# Patient Record
Sex: FEMALE | Race: WHITE | URBAN - METROPOLITAN AREA
[De-identification: names, ages, dates, MRNs, and addresses within clinical notes are randomized per-mention and may not be internally consistent; named-entity substitution may affect disease eponyms.]

---

## 2017-05-05 ENCOUNTER — EMPLOYEE WELLNESS (OUTPATIENT)
Dept: OTHER | Age: 72
End: 2017-05-05

## 2017-05-05 LAB
CHOLESTEROL, TOTAL: 174 MG/DL (ref 0–199)
GLUCOSE BLD-MCNC: 100 MG/DL (ref 70–99)
HDLC SERPL-MCNC: 61 MG/DL (ref 40–60)
LDL CHOLESTEROL CALCULATED: 84 MG/DL
TRIGL SERPL-MCNC: 147 MG/DL (ref 0–150)

## 2018-03-20 VITALS — WEIGHT: 199 LBS

## 2018-04-09 ENCOUNTER — EMPLOYEE WELLNESS (OUTPATIENT)
Dept: OTHER | Age: 73
End: 2018-04-09

## 2018-04-09 LAB
CHOLESTEROL, TOTAL: 191 MG/DL (ref 0–199)
GLUCOSE BLD-MCNC: 125 MG/DL (ref 70–99)
HDLC SERPL-MCNC: 71 MG/DL (ref 40–60)
LDL CHOLESTEROL CALCULATED: 84 MG/DL
TRIGL SERPL-MCNC: 179 MG/DL (ref 0–150)

## 2018-04-16 VITALS — WEIGHT: 197 LBS

## 2019-04-11 ENCOUNTER — EMPLOYEE WELLNESS (OUTPATIENT)
Dept: OTHER | Age: 74
End: 2019-04-11

## 2019-04-11 LAB
CHOLESTEROL, TOTAL: 168 MG/DL (ref 0–199)
GLUCOSE BLD-MCNC: 109 MG/DL (ref 70–99)
HDLC SERPL-MCNC: 56 MG/DL (ref 40–60)
LDL CHOLESTEROL CALCULATED: 82 MG/DL
TRIGL SERPL-MCNC: 151 MG/DL (ref 0–150)

## 2019-04-15 VITALS — WEIGHT: 195 LBS

## 2020-04-21 ENCOUNTER — OFFICE VISIT (OUTPATIENT)
Dept: PRIMARY CARE CLINIC | Age: 75
End: 2020-04-21

## 2020-04-22 LAB
SARS-COV-2: NOT DETECTED
SOURCE: NORMAL

## 2024-09-20 RX ORDER — CETIRIZINE HYDROCHLORIDE 10 MG/1
10 TABLET ORAL DAILY PRN
COMMUNITY
End: 2024-09-23

## 2024-09-20 RX ORDER — MUPIROCIN 20 MG/G
0.5 OINTMENT TOPICAL 3 TIMES DAILY
COMMUNITY
Start: 2024-08-30

## 2024-09-20 RX ORDER — MULTIVITAMIN
1 TABLET ORAL EVERY MORNING
COMMUNITY

## 2024-09-20 RX ORDER — LISINOPRIL AND HYDROCHLOROTHIAZIDE 12.5; 2 MG/1; MG/1
2 TABLET ORAL DAILY
COMMUNITY
Start: 2024-01-19

## 2024-09-20 RX ORDER — ASPIRIN 325 MG
325 TABLET ORAL DAILY
COMMUNITY
Start: 2013-06-11

## 2024-09-20 RX ORDER — METOPROLOL TARTRATE 100 MG
100 TABLET ORAL
COMMUNITY
Start: 2024-01-19

## 2024-09-20 RX ORDER — FLUVOXAMINE MALEATE 50 MG
50 TABLET ORAL
COMMUNITY
Start: 2024-01-19

## 2024-09-20 RX ORDER — FLUTICASONE PROPIONATE 50 MCG
1 SPRAY, SUSPENSION (ML) NASAL DAILY
COMMUNITY
Start: 2019-12-18

## 2024-09-20 RX ORDER — VIT C/ZN GLUC/HERBAL NO.325 90 MG-15MG
2 LOZENGE MUCOUS MEMBRANE EVERY MORNING
COMMUNITY
End: 2024-09-23

## 2024-09-20 RX ORDER — MELOXICAM 15 MG/1
15 TABLET ORAL DAILY
COMMUNITY
Start: 2024-06-04

## 2024-09-20 RX ORDER — ROSUVASTATIN CALCIUM 5 MG/1
5 TABLET, COATED ORAL
COMMUNITY
Start: 2024-06-03

## 2024-09-20 NOTE — PROGRESS NOTES
9/20/2024 1318 PM:    PERSON ANSWERING PHONE ASKED FOR RETURN CALL 9/20/2024 AT 3PM/TS    H&P IN CE 9/13/2024 AND LABS IN [facility] 9/13/2024 AND EKG TRACING IN MEDIA 917/2024/TS    9/20/2024 1557 pm:    PERSON ANSWERING PHONE STATED \"(GERMAN) NOT HOME TRY BACK LATER\"-DID AGREE TO GIVE MESSAGE FOR  PT TO CALL [facility]/TS    NOTIFIED DR PITTS OFFICE TO FAX PREOP ORDERS IF NEEDED-PER EDWIN AT OFFICE \"DR PITTS WILL ENTER ORDERS HIMSELF\"/TS

## 2024-09-27 ENCOUNTER — HOSPITAL ENCOUNTER (OUTPATIENT)
Age: 79
Setting detail: OUTPATIENT SURGERY
Discharge: HOME | End: 2024-09-27
Attending: PODIATRIST | Admitting: PODIATRIST
Payer: MEDICARE

## 2024-09-27 ENCOUNTER — APPOINTMENT (OUTPATIENT)
Dept: GENERAL RADIOLOGY | Age: 79
End: 2024-09-27
Attending: PODIATRIST
Payer: MEDICARE

## 2024-09-27 ENCOUNTER — ANESTHESIA (OUTPATIENT)
Dept: OPERATING ROOM | Age: 79
End: 2024-09-27
Payer: MEDICARE

## 2024-09-27 ENCOUNTER — ANESTHESIA EVENT (OUTPATIENT)
Dept: OPERATING ROOM | Age: 79
End: 2024-09-27
Payer: MEDICARE

## 2024-09-27 VITALS
OXYGEN SATURATION: 96 % | RESPIRATION RATE: 16 BRPM | TEMPERATURE: 97.1 F | HEIGHT: 67 IN | SYSTOLIC BLOOD PRESSURE: 153 MMHG | WEIGHT: 175 LBS | HEART RATE: 49 BPM | BODY MASS INDEX: 27.47 KG/M2 | DIASTOLIC BLOOD PRESSURE: 65 MMHG

## 2024-09-27 DIAGNOSIS — E13.42 DIABETIC POLYNEUROPATHY ASSOCIATED WITH OTHER SPECIFIED DIABETES MELLITUS (HCC): ICD-10-CM

## 2024-09-27 DIAGNOSIS — G89.18 POST-OP PAIN: Primary | ICD-10-CM

## 2024-09-27 DIAGNOSIS — M20.42 HAMMERTOE OF LEFT FOOT: ICD-10-CM

## 2024-09-27 LAB
GLUCOSE BLD-MCNC: 107 MG/DL (ref 70–99)
GLUCOSE BLD-MCNC: 110 MG/DL (ref 70–99)
PERFORMED ON: ABNORMAL
PERFORMED ON: ABNORMAL

## 2024-09-27 PROCEDURE — 88311 DECALCIFY TISSUE: CPT

## 2024-09-27 PROCEDURE — 7100000011 HC PHASE II RECOVERY - ADDTL 15 MIN: Performed by: PODIATRIST

## 2024-09-27 PROCEDURE — 73630 X-RAY EXAM OF FOOT: CPT

## 2024-09-27 PROCEDURE — 2709999900 HC NON-CHARGEABLE SUPPLY: Performed by: PODIATRIST

## 2024-09-27 PROCEDURE — 3700000001 HC ADD 15 MINUTES (ANESTHESIA): Performed by: PODIATRIST

## 2024-09-27 PROCEDURE — 7100000010 HC PHASE II RECOVERY - FIRST 15 MIN: Performed by: PODIATRIST

## 2024-09-27 PROCEDURE — 6360000002 HC RX W HCPCS

## 2024-09-27 PROCEDURE — A4217 STERILE WATER/SALINE, 500 ML: HCPCS | Performed by: PODIATRIST

## 2024-09-27 PROCEDURE — 3700000000 HC ANESTHESIA ATTENDED CARE: Performed by: PODIATRIST

## 2024-09-27 PROCEDURE — 88304 TISSUE EXAM BY PATHOLOGIST: CPT

## 2024-09-27 PROCEDURE — 2580000003 HC RX 258: Performed by: PODIATRIST

## 2024-09-27 PROCEDURE — 7100000000 HC PACU RECOVERY - FIRST 15 MIN: Performed by: PODIATRIST

## 2024-09-27 PROCEDURE — 7100000001 HC PACU RECOVERY - ADDTL 15 MIN: Performed by: PODIATRIST

## 2024-09-27 PROCEDURE — 6360000002 HC RX W HCPCS: Performed by: PODIATRIST

## 2024-09-27 PROCEDURE — 3600000003 HC SURGERY LEVEL 3 BASE: Performed by: PODIATRIST

## 2024-09-27 PROCEDURE — 2580000003 HC RX 258: Performed by: ANESTHESIOLOGY

## 2024-09-27 PROCEDURE — 2500000003 HC RX 250 WO HCPCS: Performed by: PODIATRIST

## 2024-09-27 PROCEDURE — 3600000013 HC SURGERY LEVEL 3 ADDTL 15MIN: Performed by: PODIATRIST

## 2024-09-27 RX ORDER — SODIUM CHLORIDE 9 MG/ML
INJECTION, SOLUTION INTRAVENOUS PRN
Status: DISCONTINUED | OUTPATIENT
Start: 2024-09-27 | End: 2024-09-27 | Stop reason: HOSPADM

## 2024-09-27 RX ORDER — HYDROMORPHONE HYDROCHLORIDE 1 MG/ML
0.5 INJECTION, SOLUTION INTRAMUSCULAR; INTRAVENOUS; SUBCUTANEOUS EVERY 5 MIN PRN
Status: DISCONTINUED | OUTPATIENT
Start: 2024-09-27 | End: 2024-09-27 | Stop reason: HOSPADM

## 2024-09-27 RX ORDER — BUPIVACAINE HYDROCHLORIDE 5 MG/ML
INJECTION, SOLUTION EPIDURAL; INTRACAUDAL PRN
Status: DISCONTINUED | OUTPATIENT
Start: 2024-09-27 | End: 2024-09-27 | Stop reason: ALTCHOICE

## 2024-09-27 RX ORDER — LIDOCAINE HYDROCHLORIDE 10 MG/ML
INJECTION, SOLUTION EPIDURAL; INFILTRATION; INTRACAUDAL; PERINEURAL PRN
Status: DISCONTINUED | OUTPATIENT
Start: 2024-09-27 | End: 2024-09-27 | Stop reason: HOSPADM

## 2024-09-27 RX ORDER — PROCHLORPERAZINE EDISYLATE 5 MG/ML
5 INJECTION INTRAMUSCULAR; INTRAVENOUS
Status: DISCONTINUED | OUTPATIENT
Start: 2024-09-27 | End: 2024-09-27 | Stop reason: HOSPADM

## 2024-09-27 RX ORDER — CIPROFLOXACIN 500 MG/1
500 TABLET, FILM COATED ORAL 2 TIMES DAILY
COMMUNITY

## 2024-09-27 RX ORDER — FENTANYL CITRATE 50 UG/ML
INJECTION, SOLUTION INTRAMUSCULAR; INTRAVENOUS
Status: DISCONTINUED | OUTPATIENT
Start: 2024-09-27 | End: 2024-09-27 | Stop reason: SDUPTHER

## 2024-09-27 RX ORDER — MIDAZOLAM HYDROCHLORIDE 1 MG/ML
INJECTION INTRAMUSCULAR; INTRAVENOUS
Status: DISCONTINUED | OUTPATIENT
Start: 2024-09-27 | End: 2024-09-27 | Stop reason: SDUPTHER

## 2024-09-27 RX ORDER — TRAMADOL HYDROCHLORIDE 50 MG/1
50 TABLET ORAL EVERY 4 HOURS PRN
Qty: 18 TABLET | Refills: 0 | Status: SHIPPED | OUTPATIENT
Start: 2024-09-27 | End: 2024-10-02

## 2024-09-27 RX ORDER — ONDANSETRON 2 MG/ML
4 INJECTION INTRAMUSCULAR; INTRAVENOUS
Status: DISCONTINUED | OUTPATIENT
Start: 2024-09-27 | End: 2024-09-27 | Stop reason: HOSPADM

## 2024-09-27 RX ORDER — IPRATROPIUM BROMIDE AND ALBUTEROL SULFATE 2.5; .5 MG/3ML; MG/3ML
1 SOLUTION RESPIRATORY (INHALATION)
Status: DISCONTINUED | OUTPATIENT
Start: 2024-09-27 | End: 2024-09-27 | Stop reason: HOSPADM

## 2024-09-27 RX ORDER — LANOLIN ALCOHOL/MO/W.PET/CERES
3 CREAM (GRAM) TOPICAL DAILY
COMMUNITY

## 2024-09-27 RX ORDER — SODIUM CHLORIDE 0.9 % (FLUSH) 0.9 %
5-40 SYRINGE (ML) INJECTION EVERY 12 HOURS SCHEDULED
Status: DISCONTINUED | OUTPATIENT
Start: 2024-09-27 | End: 2024-09-27 | Stop reason: HOSPADM

## 2024-09-27 RX ORDER — ONDANSETRON 2 MG/ML
INJECTION INTRAMUSCULAR; INTRAVENOUS
Status: DISCONTINUED | OUTPATIENT
Start: 2024-09-27 | End: 2024-09-27 | Stop reason: SDUPTHER

## 2024-09-27 RX ORDER — SODIUM CHLORIDE, SODIUM LACTATE, POTASSIUM CHLORIDE, CALCIUM CHLORIDE 600; 310; 30; 20 MG/100ML; MG/100ML; MG/100ML; MG/100ML
INJECTION, SOLUTION INTRAVENOUS CONTINUOUS
Status: DISCONTINUED | OUTPATIENT
Start: 2024-09-27 | End: 2024-09-27 | Stop reason: HOSPADM

## 2024-09-27 RX ORDER — NALOXONE HYDROCHLORIDE 0.4 MG/ML
INJECTION, SOLUTION INTRAMUSCULAR; INTRAVENOUS; SUBCUTANEOUS PRN
Status: DISCONTINUED | OUTPATIENT
Start: 2024-09-27 | End: 2024-09-27 | Stop reason: HOSPADM

## 2024-09-27 RX ORDER — LABETALOL HYDROCHLORIDE 5 MG/ML
10 INJECTION, SOLUTION INTRAVENOUS
Status: DISCONTINUED | OUTPATIENT
Start: 2024-09-27 | End: 2024-09-27 | Stop reason: HOSPADM

## 2024-09-27 RX ORDER — LIDOCAINE HYDROCHLORIDE 20 MG/ML
INJECTION, SOLUTION INTRAVENOUS
Status: DISCONTINUED | OUTPATIENT
Start: 2024-09-27 | End: 2024-09-27 | Stop reason: SDUPTHER

## 2024-09-27 RX ORDER — ACETAMINOPHEN 325 MG/1
650 TABLET ORAL
Status: DISCONTINUED | OUTPATIENT
Start: 2024-09-27 | End: 2024-09-27 | Stop reason: HOSPADM

## 2024-09-27 RX ORDER — SODIUM CHLORIDE 0.9 % (FLUSH) 0.9 %
5-40 SYRINGE (ML) INJECTION PRN
Status: DISCONTINUED | OUTPATIENT
Start: 2024-09-27 | End: 2024-09-27 | Stop reason: HOSPADM

## 2024-09-27 RX ORDER — FENTANYL CITRATE 50 UG/ML
25 INJECTION, SOLUTION INTRAMUSCULAR; INTRAVENOUS EVERY 5 MIN PRN
Status: DISCONTINUED | OUTPATIENT
Start: 2024-09-27 | End: 2024-09-27 | Stop reason: HOSPADM

## 2024-09-27 RX ORDER — PROPOFOL 10 MG/ML
INJECTION, EMULSION INTRAVENOUS
Status: DISCONTINUED | OUTPATIENT
Start: 2024-09-27 | End: 2024-09-27 | Stop reason: SDUPTHER

## 2024-09-27 RX ADMIN — SODIUM CHLORIDE, POTASSIUM CHLORIDE, SODIUM LACTATE AND CALCIUM CHLORIDE: 600; 310; 30; 20 INJECTION, SOLUTION INTRAVENOUS at 13:01

## 2024-09-27 RX ADMIN — LIDOCAINE HYDROCHLORIDE 100 MG: 20 INJECTION, SOLUTION INTRAVENOUS at 14:26

## 2024-09-27 RX ADMIN — FENTANYL CITRATE 50 MCG: 50 INJECTION, SOLUTION INTRAMUSCULAR; INTRAVENOUS at 14:53

## 2024-09-27 RX ADMIN — ONDANSETRON 4 MG: 2 INJECTION INTRAMUSCULAR; INTRAVENOUS at 14:32

## 2024-09-27 RX ADMIN — MIDAZOLAM HYDROCHLORIDE 2 MG: 2 INJECTION, SOLUTION INTRAMUSCULAR; INTRAVENOUS at 14:26

## 2024-09-27 RX ADMIN — WATER 2000 MG: 1 INJECTION INTRAMUSCULAR; INTRAVENOUS; SUBCUTANEOUS at 14:32

## 2024-09-27 RX ADMIN — PROPOFOL 150 MCG/KG/MIN: 10 INJECTION, EMULSION INTRAVENOUS at 14:26

## 2024-09-27 RX ADMIN — LIDOCAINE HYDROCHLORIDE 100 MG: 20 INJECTION, SOLUTION INTRAVENOUS at 14:32

## 2024-09-27 RX ADMIN — FENTANYL CITRATE 50 MCG: 50 INJECTION, SOLUTION INTRAMUSCULAR; INTRAVENOUS at 14:39

## 2024-09-27 ASSESSMENT — PAIN SCALES - GENERAL
PAINLEVEL_OUTOF10: 2
PAINLEVEL_OUTOF10: 0
PAINLEVEL_OUTOF10: 0
PAINLEVEL_OUTOF10: 1

## 2024-09-27 ASSESSMENT — ENCOUNTER SYMPTOMS: SHORTNESS OF BREATH: 0

## 2024-09-27 ASSESSMENT — LIFESTYLE VARIABLES: SMOKING_STATUS: 0

## 2024-09-27 NOTE — BRIEF OP NOTE
Brief Postoperative Note      Patient: Sepideh Soliz  YOB: 1945  MRN: [id number]    Date of Procedure: 9/27/2024    Pre-Op Diagnosis Codes:      * Hammertoe of left foot [M20.42]     * Diabetic polyneuropathy associated with other specified diabetes mellitus (HCC) [E13.42]    Post-Op Diagnosis: Same       Procedure(s):  PARTIAL AMPUTATION LEFT SECOND TOE AT INTERPHALANGEAL JOINT    Surgeon(s):  Terry Walker DPM    Assistant:  Resident: Cheryle Leach DPM    Anesthesia: Monitor Anesthesia Care    Estimated Blood Loss (mL): Minimal    Hemostasis: anatomic dissection and electrocautery    Injectables: Pre-Op 10 cc of 1% Lidocaine plain and Post-Op 10 cc of 0.5 % Marcaine plain    Materials: 4-0 Nylon    Complications: None    Specimens:   ID Type Source Tests Collected by Time Destination   A : LEFT DISTAL SECOND DIGIT Tissue Tissue SURGICAL PATHOLOGY Terry Walker DPM 9/27/2024 1441      Implants: None      Drains: * No LDAs found *    Findings:  Infection Present At Time Of Surgery (PATOS) (choose all levels that have infection present):  No infection present  Other Findings: Rigid hammer toe contracture of the left foot second toe. Distal callus of the second toe. Post operative the distal toe was removed and closed in entirety. No clinical signs of infection.    Electronically signed by Cheryle Leach DPM on 9/27/2024 at 3:05 PM

## 2024-09-27 NOTE — H&P
Podiatric Surgery Same Day Pre-op H&P Update Note  Sepideh Soliz I have examined the patient and reviewed the original history and physical completed by Dr. Catarina Montanez CNP on 9/17/24 and find no relevant changes.        The nature of the procedure, possible complications, alternative forms of therapy, post-op course, and post-op goals have been explained to patient/patient family.  All questions have been answered. The consent has been signed.  Patient's surgical site has been marked.       Cheryle Leach DPM   Podiatric Resident PGY2  [employer]  9/27/2024, 1:13 PM

## 2024-09-27 NOTE — PROGRESS NOTES
Patient arrived from OR to PACU # 9 s/p  PARTIAL AMPUTATION LEFT SECOND TOE AT INTERPHALANGEAL JOINT (Left: Foot) per . Attached to PACU monitoring device report received from CRNA who stated no problems intraoperatively. Arrived sedated with simple mask,

## 2024-09-27 NOTE — PROGRESS NOTES
Ambulatory Surgery/Procedure Discharge Note    Vitals:    09/27/24 1600   BP: (!) 153/65   Pulse: (!) 49   Resp: 16   Temp: 97.1 °F (36.2 °C)   SpO2: 96%       In: 500 [I.V.:500]  Out: -     Restroom use offered before discharge.  Yes    Pain assessment:  present - adequately treated. Patient satisfied with current pain level, ice therapy applied to LLE.  Pain Level: 2    BP & HR within 20% of pre-op albertina score.     Discharge order obtained, and discharge instructions reviewed. Patient educated, using the teach back method, about follow up instructions and discharge instructions. A completed copy of the AVS instructions given to patient and all questions answered. IV catheter removed without complaints, catheter intact, site WNL.   Patient discharged to home/self care. Patient discharged via wheel chair by transporter to waiting family/S.O.       9/27/2024 4:55 PM

## 2024-09-27 NOTE — DISCHARGE INSTRUCTIONS
hardness around operative area.  Numb, tingling or cold toes.  Toe(s) become white or bluish  Bandage becomes wet, soiled, or blood soaked (small amount of bleeding may be normal)  Increased or progressive drainage from surgical area.    Follow up instructions:  You will need to follow up with Dr. Walker's office in the next 5 to 7 days.  Call [phone] when you get home to make an appointment.  Call Dr. Walker's office if you have any questions or concerns.    Dr. Terry Walker, [facility]  [facility]  Office: [phone]  Fax: [phone]     [facility] AMBULATORY PROCEDURE DISCHARGE INSTRUCTIONS    There are potential side effects of anesthesia or sedation you may experience for the first 24 hours.  These side effects include:    Confusion or Memory loss, Dizziness, or Delayed Reaction Times   [x]A responsible person should be with you for the next 24 hours.  Do not operate any vehicles (automobiles, bicycles, motorcycles) or power tools or machinery for 24 hours.  Do not sign any legal documents or make any legal decisions for 24 hours. Do not drink alcohol for 24 hours or while taking narcotic pain medication.      Nausea    [x]Start with light diet and progress to your normal diet as you feel like eating. However, if you experience nausea or repeated episodes of vomiting which persist beyond 12-24 hours, notify your physician.  Once nausea has passed, remember to keep drinking fluids.    Difficulty Passing Urine  [x]Drink extra amounts of fluid today.  Notify your physician if you have not urinated within 8 hours after your procedure or you feel uncomfortable.      Irritated Throat from a Breathing Tube  [x]Drink extra amounts of fluid today.  Lozenges may help.    Muscle Aches  [x]You may experience some generalized body aches as your muscles recover from medications used to relax them during surgery.  These will gradually subside.    MEDICATION INSTRUCTIONS:    Use as directed.  When taking  pain medications, you may experience the side effect of dizziness or drowsiness.  Do not drink alcohol or drive when taking these medications.  [x]Prescription(S) x 1 Called to Pharmacy Name and location: these medications from [facility] [id number] - [place], [place] - [address] - P [phone] - F [phone]  [employer]    [x]Give the list of your medications to your primary care physician on your next visit. Keep your med list updated and carry it with in case of emergencies.    [x] Narcotic pain medications can cause the side effect of significant constipation.  You may want to add a stool softener to your postoperative medication schedule or speak to your surgeon on how best to manage this side effect.    NARCOTIC SAFETY:  Your pain medicine is only for you to take.  Safely store your medicines.  Store pills up high and out of reach of children and pets.  Ensure safety caps are snapped tightly  Keep track of how many pills you have left    Unused medication can be disposed of by taking them to a drop-off box or take-back program that is authorized by the [facility].  Access to a site near you can be found on the [employer] website (deadiversion.usdoj.gov).    If you have a CPAP machine, it is very important that you use it daily during all periods of sleep and daytime rest during your recovery at home.  Surgery and Anesthesia place a significant amount of stress on your body.  Using your CPAP will help keep you safe and lessen the negative effects of that stress.    FOLLOW-UP RECOVERY CARE:  [x]Schedule an appointment with Terry GRAFF as soon as possible for a visit in 1 week  [facility]  [phone]    Watch for these possible complications, symptoms, or side effects of anesthesia.  Call physician if they or any other problems occur:  Signs of INFECTION   > Fever over 101°     > Redness, swelling, hardness or warmth at the operative site   >Foul smelling or cloudy drainage at the

## 2024-09-27 NOTE — PROGRESS NOTES
PACU Transfer to [facility]    Vitals:    09/27/24 1545   BP: 134/68   Pulse: 59   Resp: 12   Temp: 97.5 °F (36.4 °C)   SpO2: 91%         Intake/Output Summary (Last 24 hours) at 9/27/2024 1557  Last data filed at 9/27/2024 1507  Gross per 24 hour   Intake 500 ml   Output --   Net 500 ml       Pain assessment:  not receiving treatment  Pain Level: 1    Patient transferred to care of BONG RN.    9/27/2024 3:57 PM

## 2024-09-30 NOTE — OP NOTE
Operative Note      Patient: Sepideh Soliz  YOB: 1945  MRN: [id number]     Date of Procedure: 9/27/2024     Pre-Op Diagnosis Codes:      * Hammertoe of left foot [M20.42]     * Diabetic polyneuropathy associated with other specified diabetes mellitus (HCC) [E13.42]     Post-Op Diagnosis: Same       Procedure(s):  PARTIAL AMPUTATION LEFT SECOND TOE AT INTERPHALANGEAL JOINT     Surgeon(s):  Terry Walker DPM     Assistant:  Resident: Cheryle Leach DPM     Anesthesia: Monitor Anesthesia Care     Estimated Blood Loss (mL): Minimal     Hemostasis: anatomic dissection and electrocautery     Injectables: Pre-Op 10 cc of 1% Lidocaine plain and Post-Op 10 cc of 0.5 % Marcaine plain     Materials: 4-0 Nylon     Complications: None     Specimens:   ID Type Source Tests Collected by Time Destination   A : LEFT DISTAL SECOND DIGIT Tissue Tissue SURGICAL PATHOLOGY Terry Walker DPM 9/27/2024 1441        Implants: None      Drains: * No LDAs found *     Findings:  Infection Present At Time Of Surgery (PATOS) (choose all levels that have infection present):  No infection present  Other Findings: Rigid hammer toe contracture of the left foot second toe. Distal callus of the second toe. Post operative the distal toe was removed and closed in entirety. No clinical signs of infection.    INDICATIONS FOR PROCEDURE: This patient has signs and symptoms clinically and radiographically consistent with the above mentioned preoperative diagnosis. Having failed conservative treatment, it was determined that the patient would benefit from surgical intervention. All potential risks, benefits, and complications were discussed with the patient prior to the scheduling of surgery. All the patient's questions were answered and no guarantees were given. The patient wished to proceed with surgery, and informed written consent was obtained.     DETAILS OF PROCEDURE: The patient was brought from the pre-operative area and

## (undated) DEVICE — PODIATRY: Brand: MEDLINE INDUSTRIES, INC.

## (undated) DEVICE — TOWEL,STOP FLAG GOLD N-W: Brand: MEDLINE

## (undated) DEVICE — COVER,TABLE,HEAVY DUTY,77"X90",STRL: Brand: MEDLINE

## (undated) DEVICE — BLADE,CARBON-STEEL,10,STRL,DISPOSABLE,TB: Brand: MEDLINE

## (undated) DEVICE — GLOVE,SURG,SENSICARE SLT,LF,PF,8: Brand: MEDLINE

## (undated) DEVICE — SOLUTION IV 1000ML 0.9% SOD CHL

## (undated) DEVICE — STANDARD HYPODERMIC NEEDLE,POLYPROPYLENE HUB: Brand: MONOJECT

## (undated) DEVICE — HANDPIECE SUCTION TUBING INTERPULSE 10FT

## (undated) DEVICE — LOOP,VESSEL,MAXI,BLUE,2/PK,STERILE: Brand: MEDLINE

## (undated) DEVICE — TOWEL,OR,DSP,ST,BLUE,DLX,8/PK,10PK/CS: Brand: MEDLINE

## (undated) DEVICE — GOWN,SIRUS,POLYRNF,BRTHSLV,XL,30/CS: Brand: MEDLINE

## (undated) DEVICE — COVER LT HNDL BLU PLAS

## (undated) DEVICE — BANDAGE COMPR M W4INXL10YD WHT BGE VELC E MTRX HK AND LOOP

## (undated) DEVICE — TRAP FLUID

## (undated) DEVICE — ZIMMER® STERILE DISPOSABLE TOURNIQUET CUFF WITH PLC, DUAL PORT, SINGLE BLADDER, 18 IN. (46 CM)

## (undated) DEVICE — NEPTUNE E-SEP SMOKE EVACUATION PENCIL, COATED, 70MM BLADE, PUSH BUTTON SWITCH: Brand: NEPTUNE E-SEP

## (undated) DEVICE — 3M™ IOBAN™ 2 ANTIMICROBIAL INCISE DRAPE 6640EZ: Brand: IOBAN™ 2

## (undated) DEVICE — SYRINGE MED 10ML LUERLOCK TIP W/O SFTY DISP

## (undated) DEVICE — HIGH FLOW TIP

## (undated) DEVICE — PREMIUM WET SKIN PREP TRAY: Brand: MEDLINE INDUSTRIES, INC.

## (undated) DEVICE — SWAB SPEC COLLCTN DBL W/O CHAR CULTURESWAB +